# Patient Record
Sex: FEMALE | Race: BLACK OR AFRICAN AMERICAN | Employment: FULL TIME | URBAN - METROPOLITAN AREA
[De-identification: names, ages, dates, MRNs, and addresses within clinical notes are randomized per-mention and may not be internally consistent; named-entity substitution may affect disease eponyms.]

---

## 2017-01-19 ENCOUNTER — OFFICE VISIT (OUTPATIENT)
Dept: FAMILY MEDICINE CLINIC | Age: 37
End: 2017-01-19

## 2017-01-19 VITALS
WEIGHT: 158.6 LBS | BODY MASS INDEX: 28.1 KG/M2 | RESPIRATION RATE: 18 BRPM | SYSTOLIC BLOOD PRESSURE: 110 MMHG | HEART RATE: 91 BPM | HEIGHT: 63 IN | OXYGEN SATURATION: 98 % | DIASTOLIC BLOOD PRESSURE: 74 MMHG | TEMPERATURE: 98.2 F

## 2017-01-19 DIAGNOSIS — J30.89 PERENNIAL ALLERGIC RHINITIS, UNSPECIFIED ALLERGIC RHINITIS TRIGGER: Primary | ICD-10-CM

## 2017-01-19 RX ORDER — ACETAMINOPHEN AND CODEINE PHOSPHATE 120; 12 MG/5ML; MG/5ML
SOLUTION ORAL
COMMUNITY
End: 2017-04-17 | Stop reason: ALTCHOICE

## 2017-01-19 RX ORDER — FLUTICASONE PROPIONATE 50 MCG
SPRAY, SUSPENSION (ML) NASAL
Qty: 1 BOTTLE | Refills: 3 | Status: SHIPPED | OUTPATIENT
Start: 2017-01-19 | End: 2018-11-15

## 2017-01-19 NOTE — PROGRESS NOTES
Acute Care Visit    Today's Date:  2017   Patient's Name: Deng Johnson   Patient's :  1980     History:     Chief Complaint   Patient presents with    Establish Care    Watery Eyes     itchy throat, headaches, runny nose with yellow drainage, cough symptom  started in Dec.       Deng Johnson is a 39 y.o. female presenting for Acute Care      Allergic Rhinitis     Onset of symptoms: 3-4 months ago noticed that when started new job in [employer] she had symptoms. Was on antihistamine, flonase and albuterol in the past with improvements but stopped about 1 year ago  Reports history of seasonal allergeries  Current symtoms are: nasal congestion, sinus pressure, post nasal drip and puffy eyes  Triggers: new jobs  Currently taking claritin D with some improvements  Denies fevers, chills, wheezing or productive cough      Past Medical History   Diagnosis Date    Asthma      History reviewed. No pertinent past surgical history. Social History     Social History    Marital status: SINGLE     Spouse name: N/A    Number of children: N/A    Years of education: N/A     Social History Main Topics    Smoking status: Current Every Day Smoker     Packs/day: 0.50    Smokeless tobacco: None    Alcohol use 3.6 oz/week     6 Cans of beer per week    Drug use: No    Sexual activity: Yes     Partners: Male     Birth control/ protection: Pill, Condom     Other Topics Concern    None     Social History Narrative    None     Family History   Problem Relation Age of Onset   [place] Arthritis-rheumatoid Mother     Diabetes Father     High Cholesterol Father      Allergies   Allergen Reactions    Tylenol-Codeine #3 [Acetaminophen-Codeine] Rash    Vicodin [Hydrocodone-Acetaminophen] Rash       Problem List:    There is no problem list on file for this patient.       Medications:     Current Outpatient Prescriptions   Medication Sig    multivitamin, tx-iron-ca-min (THERA-M W/ IRON) 9 mg iron-400 mcg tab tablet Take 1 Tab by mouth daily.  norethindrone (ORTHO MICRONOR) 0.35 mg tab Take  by mouth.  fluticasone (FLONASE) 50 mcg/actuation nasal spray 1-2 sprays in each nostril daily     No current facility-administered medications for this visit. Constitutional: negative for fevers, chills, sweats and fatigue  Ears, nose, mouth, throat, and face: positive for nasal congestion and sore throat, negative for earaches  Respiratory: positive for cough, negative for sputum or wheezing  Cardiovascular: negative for chest pain, chest pressure/discomfort, dyspnea  Gastrointestinal: positive for nausea, negative for vomiting, diarrhea, constipation and abdominal pain    Physical Assessment:   VS:    Visit Vitals    /74 (BP 1 Location: Right arm, BP Patient Position: Sitting)    Pulse 91    Temp 98.2 °F (36.8 °C) (Oral)    Resp 18    Ht 5' 3\" (1.6 m)    Wt 158 lb 9.6 oz (71.9 kg)    LMP 01/07/2017 (Exact Date)    SpO2 98%    BMI 28.09 kg/m2       No results found for: NA, K, CL, CO2, AGAP, GLU, BUN, CREA, BUCR, GFRAA, GFRNA, CA, GFRAA    General:   Well-groomed, well-nourished, in no distress, pleasant, alert, appropriate and conversant. ENT:   Normal TM's and swollen nasal turbinates  Mouth:  Good dentition, oropharynx WNL without membranes, exudates, petechiae or ulcers  Neck:   Neck supple, no swelling, mass or tenderness, no thyromegaly  Cardiovasc:   RRR, no MRG. Pulses 2+ and symmetric at distal extremities. Pulmonary:   Lungs clear bilaterally. Normal respiratory effort. Abdomen:   Abdomen soft, NT, ND, NAB. Extremities:   No edema, no TTP bilateral calves. LEs warm and well-perfused. Neuro:   Alert and oriented,  no focal deficits. No facial asymmetry noted. Skin:    No rashes or jaundice  MSK:   Normal ROM, 5/5 muscle strength  Psych:  No pressured speech or abnormal thought content        Assessment/Plan & Orders:       1.  Perennial allergic rhinitis, unspecified allergic rhinitis trigger Orders Placed This Encounter    multivitamin, tx-iron-ca-min (THERA-M W/ IRON) 9 mg iron-400 mcg tab tablet    norethindrone (ORTHO MICRONOR) 0.35 mg tab    fluticasone (FLONASE) 50 mcg/actuation nasal spray       Allergic Rhinitis  -will send in flonase and recommend OTC antihistamine    Follow up in 2-3 months    *Plan of care reviewed with patient. Patient in agreement with plan and expresses understanding. All questions answered and patient encouraged to call or RTO if further questions or concerns.     Yvan Smith MD  Family Medicine  1/19/2017  12:34 PM

## 2017-01-19 NOTE — PATIENT INSTRUCTIONS
Sinusitis: Care Instructions  Your Care Instructions    Sinusitis is an infection of the lining of the sinus cavities in your head. Sinusitis often follows a cold. It causes pain and pressure in your head and face. In most cases, sinusitis gets better on its own in 1 to 2 weeks. But some mild symptoms may last for several weeks. Sometimes antibiotics are needed. Follow-up care is a key part of your treatment and safety. Be sure to make and go to all appointments, and call your doctor if you are having problems. It's also a good idea to know your test results and keep a list of the medicines you take. How can you care for yourself at home? · Take an over-the-counter pain medicine, such as acetaminophen (Tylenol), ibuprofen (Advil, Motrin), or naproxen (Aleve). Read and follow all instructions on the label. · If the doctor prescribed antibiotics, take them as directed. Do not stop taking them just because you feel better. You need to take the full course of antibiotics. · Be careful when taking over-the-counter cold or flu medicines and Tylenol at the same time. Many of these medicines have acetaminophen, which is Tylenol. Read the labels to make sure that you are not taking more than the recommended dose. Too much acetaminophen (Tylenol) can be harmful. · Breathe warm, moist air from a steamy shower, a hot bath, or a sink filled with hot water. Avoid cold, dry air. Using a humidifier in your home may help. Follow the directions for cleaning the machine. · Use saline (saltwater) nasal washes to help keep your nasal passages open and wash out mucus and bacteria. You can buy saline nose drops at a grocery store or drugstore. Or you can make your own at home by adding 1 teaspoon of salt and 1 teaspoon of baking soda to 2 cups of distilled water. If you make your own, fill a bulb syringe with the solution, insert the tip into your nostril, and squeeze gently. Aruna Baldwin your nose.   · Put a hot, wet towel or a warm gel pack on your face 3 or 4 times a day for 5 to 10 minutes each time. · Try a decongestant nasal spray like oxymetazoline (Afrin). Do not use it for more than 3 days in a row. Using it for more than 3 days can make your congestion worse. When should you call for help? Call your doctor now or seek immediate medical care if:  · You have new or worse swelling or redness in your face or around your eyes. · You have a new or higher fever. Watch closely for changes in your health, and be sure to contact your doctor if:  · You have new or worse facial pain. · The mucus from your nose becomes thicker (like pus) or has new blood in it. · You are not getting better as expected. Where can you learn more? Go to http://varun-maría.info/. Enter D819 in the search box to learn more about \"Sinusitis: Care Instructions. \"  Current as of: July 29, 2016  Content Version: 11.1  © 20064317-8427 [employer]. Care instructions adapted under license by [employer] (which disclaims liability or warranty for this information). If you have questions about a medical condition or this instruction, always ask your healthcare professional. [place] [address] any warranty or liability for your use of this information.

## 2017-02-10 ENCOUNTER — OFFICE VISIT (OUTPATIENT)
Dept: OBGYN CLINIC | Age: 37
End: 2017-02-10

## 2017-02-10 ENCOUNTER — HOSPITAL ENCOUNTER (OUTPATIENT)
Dept: LAB | Age: 37
Discharge: HOME | End: 2017-02-10
Payer: COMMERCIAL

## 2017-02-10 VITALS
DIASTOLIC BLOOD PRESSURE: 89 MMHG | WEIGHT: 164 LBS | SYSTOLIC BLOOD PRESSURE: 123 MMHG | HEIGHT: 63 IN | BODY MASS INDEX: 29.06 KG/M2 | HEART RATE: 86 BPM

## 2017-02-10 DIAGNOSIS — Z71.6 ENCOUNTER FOR SMOKING CESSATION COUNSELING: ICD-10-CM

## 2017-02-10 DIAGNOSIS — N93.8 DYSFUNCTIONAL UTERINE BLEEDING: Primary | ICD-10-CM

## 2017-02-10 DIAGNOSIS — N93.8 DYSFUNCTIONAL UTERINE BLEEDING: ICD-10-CM

## 2017-02-10 LAB
T4 FREE SERPL-MCNC: 0.9 NG/DL (ref 0.7–1.5)
TSH SERPL DL<=0.05 MIU/L-ACNC: 0.74 UIU/ML (ref 0.36–3.74)

## 2017-02-10 PROCEDURE — 83001 ASSAY OF GONADOTROPIN (FSH): CPT | Performed by: ADVANCED PRACTICE MIDWIFE

## 2017-02-10 PROCEDURE — 84403 ASSAY OF TOTAL TESTOSTERONE: CPT | Performed by: ADVANCED PRACTICE MIDWIFE

## 2017-02-10 PROCEDURE — 84146 ASSAY OF PROLACTIN: CPT | Performed by: ADVANCED PRACTICE MIDWIFE

## 2017-02-10 PROCEDURE — 82670 ASSAY OF TOTAL ESTRADIOL: CPT | Performed by: ADVANCED PRACTICE MIDWIFE

## 2017-02-10 PROCEDURE — 84439 ASSAY OF FREE THYROXINE: CPT | Performed by: ADVANCED PRACTICE MIDWIFE

## 2017-02-10 RX ORDER — NORETHINDRONE ACETATE AND ETHINYL ESTRADIOL 1MG-20(21)
1 KIT ORAL DAILY
Qty: 28 TAB | Refills: 1 | Status: SHIPPED | OUTPATIENT
Start: 2017-02-10 | End: 2017-04-17 | Stop reason: SDUPTHER

## 2017-02-10 NOTE — PROGRESS NOTES
SUBJ: Ms. Yuridia Anderson is a 39 y.o. y/o female, , who presents today for     Chief Complaint   Patient presents with   [address]       Her LMP was Patient's last menstrual period was 2017 (exact date). Crow Bermudez is a new patient to our practice. She was referred to our office by her PCP Dr. Noel Reeves. Patient is currently rx'ed Micronor POP from [facility]. She has been taking this medication since 2013. Last Pap and well woman performed at the health de[facility] 3/2016. Before birth control, patient had irregular menses. Menses only came q3-4 months and lasted 7 days. Periods have been irregular since menarche per pt, she has not had a work up for this concern. While on POP, she has had irregular menses that comes twice monthly and lasts 7-10 days. She may also have days of light spotting in between. The irregular bleeding has made work difficult because of the need to keep interrupting work to change her pad. She is interested in trying an alternative form of birth control. Patient has medical hx of asthma and OB hx of 1 AB and 2 SABs. Patient desires pregnancy in the future prior to age 36 if possible - \"within the next few years\". Patient does admit to being a daily smoker. She currently smokes 1 pack per day but is actively trying to stop smoking. Patient verbalizes readiness to change. She denies hx of cardiac events. Health hx reviewed.      Past Medical History   Diagnosis Date    Asthma       Past Surgical History   Procedure Laterality Date    Hx dilation and curettage  ,      Family History   Problem Relation Age of Onset   [facility] Arthritis-rheumatoid Mother     Diabetes Father     High Cholesterol Father     No Known Problems Brother     Thyroid Disease Maternal Grandmother      Social History     Social History    Marital status: SINGLE     Spouse name: N/A    Number of children: N/A    Years of education: N/A     Social History Main Topics    Smoking status: Current Every Day Smoker     Packs/day: 0.50    Smokeless tobacco: None    Alcohol use 3.6 oz/week     6 Cans of beer per week      Comment: occassion     Drug use: No    Sexual activity: Yes     Partners: Male     Birth control/ protection: Pill, Condom     Other Topics Concern    None     Social History Narrative         PE:   Visit Vitals    /89 (BP 1 Location: Right arm, BP Patient Position: Sitting)    Pulse 86    Ht 5' 3\" (1.6 m)    Wt 164 lb (74.4 kg)    LMP 01/07/2017 (Exact Date)    BMI 29.05 kg/m2       Pt is a well developed female who is alert and oriented x 3. CVS exam: normal rate, regular rhythm, normal S1, S2, no murmurs, rubs, clicks or gallops. Lungs: clear bilaterally to auscultation, no wheezing, rhonci or rales  Pelvic exam: VULVA: normal appearing vulva with no masses, tenderness or lesions, VAGINA: normal appearing vagina with normal color and discharge, no lesions, CERVIX: normal appearing cervix without discharge or lesions, cervical motion tenderness absent, UTERUS: uterus is normal size, shape, consistency and nontender, ADNEXA: normal adnexa in size, nontender and no masses. Assessment/Plan:       1. Dysfunctional uterine bleeding  Will do lab work to identify cause of irregular bleeding  Will change BC to low-dose GABE to regulate menses and improve patient quality of life  Counseled on all forms of birth control including GABE, patch, nuvaring, depo and IUD --> reviewed R/B/A and possible s/e  Patient desires low-dose GABE    - TSH AND FREE T4; Future  - PROLACTIN; Future  - norethindrone-ethinyl estradiol (JUNEL FE 1/20, 28,) 1 mg-20 mcg (21)/75 mg (7) tab; Take 1 Tab by mouth daily. Dispense: 28 Tab; Refill: 1  - FSH AND LH; Future  - ESTRADIOL; Future  - TESTOSTERONE, FREE & TOTAL; Future    2.  Encounter for smoking cessation counseling  Counseled on smoking cessation including triggers, stress and mechanisms to quit  Patient verbalizes readiness to quit  Counseled heavily on increased risk of cardiac event including MI or CVA if smoking while taking GABE  Pt verbalizes understanding of this risk and agrees to quit smoking  She desires to start this medication  D/C and seek ED care if chest pain, SOB or s/s of CVA  Patient to f/u with PCP about smoking cessation    Well woman exam due 3/2017    RTC in 1 month for GABE f/u and well woman exam    >50% of this visit spent in counseling

## 2017-02-10 NOTE — PATIENT INSTRUCTIONS

## 2017-02-11 LAB
ESTRADIOL SERPL-MCNC: 193.3 PG/ML
TESTOST FREE SERPL-MCNC: 3.9 PG/ML (ref 0–4.2)
TESTOST SERPL-MCNC: 47 NG/DL (ref 8–48)

## 2017-02-15 LAB
FSH SERPL-ACNC: 3.1 MIU/ML
LH SERPL-ACNC: 1.8 MIU/ML

## 2017-02-17 LAB — PROLACTIN SERPL-MCNC: 8.1 NG/ML

## 2017-04-17 ENCOUNTER — OFFICE VISIT (OUTPATIENT)
Dept: OBGYN CLINIC | Age: 37
End: 2017-04-17

## 2017-04-17 VITALS
WEIGHT: 158.4 LBS | HEIGHT: 63 IN | BODY MASS INDEX: 28.07 KG/M2 | SYSTOLIC BLOOD PRESSURE: 121 MMHG | HEART RATE: 100 BPM | DIASTOLIC BLOOD PRESSURE: 84 MMHG

## 2017-04-17 DIAGNOSIS — N93.8 DYSFUNCTIONAL UTERINE BLEEDING: ICD-10-CM

## 2017-04-17 DIAGNOSIS — Z71.6 ENCOUNTER FOR SMOKING CESSATION COUNSELING: Primary | ICD-10-CM

## 2017-04-17 RX ORDER — NORETHINDRONE ACETATE AND ETHINYL ESTRADIOL 1MG-20(21)
1 KIT ORAL DAILY
Qty: 28 TAB | Refills: 4 | Status: SHIPPED | OUTPATIENT
Start: 2017-04-17

## 2017-04-17 NOTE — PROGRESS NOTES
SUBJ: Ms. Iris Rascon is a 39 y.o. y/o female, , who presents today for     Chief Complaint   Patient presents with    Follow-up       Her LMP was Patient's last menstrual period was 2017. Tod Harding presents for follow up. Was seen in our office for DUB 2 months ago. Was taking Micronor po daily and began to have breakthrough bleeding on this method. Prior to taking POP, menses was irregular and only came q3-4 months. Since taking GABE, menses has regulated to once/month without complications. She denies menstrual concerns or current  complaints. Would like to continue GABE method until she is ready for conception. She also continues her smoking cessation efforts, has been only smoking on the weekends when she is off but will still go through half/pack. She is trying to find other physical activities to help her stop smoking.     Past Medical History:   Diagnosis Date    Asthma       Past Surgical History:   Procedure Laterality Date    HX DILATION AND CURETTAGE  ,      Family History   Problem Relation Age of Onset   [place] Arthritis-rheumatoid Mother     Diabetes Father     High Cholesterol Father     No Known Problems Brother     Thyroid Disease Maternal Grandmother      Social History     Social History    Marital status: SINGLE     Spouse name: N/A    Number of children: N/A    Years of education: N/A     Social History Main Topics    Smoking status: Current Every Day Smoker     Packs/day: 0.50    Smokeless tobacco: None    Alcohol use 3.6 oz/week     6 Cans of beer per week      Comment: occassion     Drug use: No    Sexual activity: Yes     Partners: Male     Birth control/ protection: Pill, Condom     Other Topics Concern    None     Social History Narrative         PE:   Visit Vitals    /84 (BP 1 Location: Right arm, BP Patient Position: Sitting)    Pulse 100    Ht 5' 3\" (1.6 m)    Wt 158 lb 6.4 oz (71.8 kg)    LMP 2017    BMI 28.06 kg/m2       Pt is a well developed female who is alert and oriented x 3. CVS exam: normal rate, regular rhythm, normal S1, S2, no murmurs, rubs, clicks or gallops. Lungs: clear bilaterally to auscultation, no wheezing, rhonci or rales  Pelvic exam: examination not indicated. Assessment/Plan:       1. Dysfunctional uterine bleeding  Will continue this method for 6 months, patient desires to do well woman exam at that time  - norethindrone-ethinyl estradiol (JUNEL FE 1/20, 28,) 1 mg-20 mcg (21)/75 mg (7) tab; Take 1 Tab by mouth daily. Dispense: 28 Tab;  Refill: 4  Discussed smoking cessation programs or medications if needed, she will discuss further with primary care at her upcoming visit in May    [facility] in 6 months for well woman exam  >50% of this visit spent in counseling

## 2017-04-17 NOTE — PATIENT INSTRUCTIONS

## 2017-05-25 DIAGNOSIS — N93.8 DYSFUNCTIONAL UTERINE BLEEDING: ICD-10-CM

## 2017-06-01 RX ORDER — NORETHINDRONE ACETATE AND ETHINYL ESTRADIOL AND FERROUS FUMARATE 1MG-20(21)
KIT ORAL
Qty: 28 TAB | Refills: 0 | Status: SHIPPED | OUTPATIENT
Start: 2017-06-01

## 2017-06-09 ENCOUNTER — OFFICE VISIT (OUTPATIENT)
Dept: FAMILY MEDICINE CLINIC | Age: 37
End: 2017-06-09

## 2017-06-09 VITALS
BODY MASS INDEX: 28.7 KG/M2 | TEMPERATURE: 97.8 F | WEIGHT: 162 LBS | SYSTOLIC BLOOD PRESSURE: 110 MMHG | HEIGHT: 63 IN | OXYGEN SATURATION: 100 % | HEART RATE: 87 BPM | RESPIRATION RATE: 20 BRPM | DIASTOLIC BLOOD PRESSURE: 80 MMHG

## 2017-06-09 DIAGNOSIS — J30.9 ALLERGIC RHINITIS, UNSPECIFIED ALLERGIC RHINITIS TRIGGER, UNSPECIFIED RHINITIS SEASONALITY: Primary | ICD-10-CM

## 2017-06-09 NOTE — PROGRESS NOTES
Chronic Care Visit    Today's Date:  2017   Patient's Name: Khadijah Wright   Patient's :  1980     History:     Chief Complaint   Patient presents with    Follow-up     pt here for follow up allergies       Khadijah Wright is a 39 y.o. female presenting for Chronic Care      Allergic Rhinitis     Since last visit she is stable on flonase  Onset of symptoms: 3-4 months ago noticed that when started new job in [employer] she had symptoms. Reports history of seasonal allergeries  Current symtoms are: nasal congestion, sinus pressure, post nasal drip and puffy eyes  Triggers: new jobs  Currently taking antihistamine with flonase  Denies fevers, chills, wheezing or productive cough      Past Medical History:   Diagnosis Date    Asthma      Past Surgical History:   Procedure Laterality Date    HX DILATION AND CURETTAGE  ,      Social History     Social History    Marital status: SINGLE     Spouse name: N/A    Number of children: N/A    Years of education: N/A     Social History Main Topics    Smoking status: Current Every Day Smoker     Packs/day: 0.50    Smokeless tobacco: None    Alcohol use 3.6 oz/week     6 Cans of beer per week      Comment: occassion     Drug use: No    Sexual activity: Yes     Partners: Male     Birth control/ protection: Pill, Condom     Other Topics Concern    None     Social History Narrative     Family History   Problem Relation Age of Onset   [facility] Arthritis-rheumatoid Mother     Diabetes Father     High Cholesterol Father     No Known Problems Brother     Thyroid Disease Maternal Grandmother      Allergies   Allergen Reactions    Tylenol-Codeine #3 [Acetaminophen-Codeine] Rash    Vicodin [Hydrocodone-Acetaminophen] Rash       Problem List:    There is no problem list on file for this patient.       Medications:     Current Outpatient Prescriptions   Medication Sig    MICROGESTIN FE , , 1 mg-20 mcg (21)/75 mg (7) tab TAKE 1 TABLET BY MOUTH DAILY    multivitamin, tx-iron-ca-min (THERA-M W/ IRON) 9 mg iron-400 mcg tab tablet Take 1 Tab by mouth daily.  fluticasone (FLONASE) 50 mcg/actuation nasal spray 1-2 sprays in each nostril daily    norethindrone-ethinyl estradiol (JUNEL FE 1/20, 28,) 1 mg-20 mcg (21)/75 mg (7) tab Take 1 Tab by mouth daily. No current facility-administered medications for this visit. Constitutional: negative for fevers, chills, sweats and fatigue  Ears, nose, mouth, throat, and face: positive for nasal congestion and sore throat, negative for earaches  Respiratory: positive for cough, negative for sputum or wheezing  Cardiovascular: negative for chest pain, chest pressure/discomfort, dyspnea  Gastrointestinal: positive for nausea, negative for vomiting, diarrhea, constipation and abdominal pain    Physical Assessment:   VS:    Visit Vitals    /80 (BP 1 Location: Left arm, BP Patient Position: Sitting)    Pulse 87    Temp 97.8 °F (36.6 °C) (Oral)    Resp 20    Ht 5' 3\" (1.6 m)    Wt 162 lb (73.5 kg)    LMP 05/22/2017    SpO2 100%    BMI 28.7 kg/m2       No results found for: NA, K, CL, CO2, AGAP, GLU, BUN, CREA, BUCR, GFRAA, GFRNA, CA, GFRAA    General:   Well-groomed, well-nourished, in no distress, pleasant, alert, appropriate and conversant. ENT:   Normal TM's and swollen nasal turbinates  Mouth:  Good dentition, oropharynx WNL without membranes, exudates, petechiae or ulcers  Neck:   Neck supple, no swelling, mass or tenderness, no thyromegaly  Cardiovasc:   RRR, no MRG. Pulses 2+ and symmetric at distal extremities. Pulmonary:   Lungs clear bilaterally. Normal respiratory effort. Abdomen:   Abdomen soft, NT, ND, NAB. Extremities:   No edema, no TTP bilateral calves. LEs warm and well-perfused. Neuro:   Alert and oriented,  no focal deficits. No facial asymmetry noted.   Skin:    No rashes or jaundice  MSK:   Normal ROM, 5/5 muscle strength  Psych:  No pressured speech or abnormal thought content        Assessment/Plan & Orders:       1. Allergic rhinitis, unspecified allergic rhinitis trigger, unspecified rhinitis seasonality        No orders of the defined types were placed in this encounter. Allergic Rhinitis  -stable on flonase and recommend OTC antihistamine    Follow up in 3-6 months    *Plan of care reviewed with patient. Patient in agreement with plan and expresses understanding. All questions answered and patient encouraged to call or RTO if further questions or concerns.     Kate Humphrey MD  Family Medicine  6/9/2017  8:20 AM

## 2018-01-20 ENCOUNTER — HOSPITAL ENCOUNTER (EMERGENCY)
Age: 38
Discharge: HOME | End: 2018-01-20
Attending: EMERGENCY MEDICINE
Payer: SELF-PAY

## 2018-01-20 VITALS
TEMPERATURE: 98.8 F | HEART RATE: 99 BPM | BODY MASS INDEX: 29.23 KG/M2 | SYSTOLIC BLOOD PRESSURE: 122 MMHG | HEIGHT: 63 IN | OXYGEN SATURATION: 99 % | DIASTOLIC BLOOD PRESSURE: 86 MMHG | RESPIRATION RATE: 16 BRPM | WEIGHT: 165 LBS

## 2018-01-20 DIAGNOSIS — B96.89 BV (BACTERIAL VAGINOSIS): Primary | ICD-10-CM

## 2018-01-20 DIAGNOSIS — L30.9 DERMATITIS: ICD-10-CM

## 2018-01-20 DIAGNOSIS — N76.0 BV (BACTERIAL VAGINOSIS): Primary | ICD-10-CM

## 2018-01-20 LAB
APPEARANCE UR: ABNORMAL
BACTERIA URNS QL MICRO: NEGATIVE /HPF
BILIRUB UR QL: NEGATIVE
COLOR UR: YELLOW
EPITH CASTS URNS QL MICRO: NORMAL /LPF (ref 0–5)
GLUCOSE UR STRIP.AUTO-MCNC: NEGATIVE MG/DL
HCG UR QL: NEGATIVE
HGB UR QL STRIP: NEGATIVE
KETONES UR QL STRIP.AUTO: NEGATIVE MG/DL
LEUKOCYTE ESTERASE UR QL STRIP.AUTO: ABNORMAL
NITRITE UR QL STRIP.AUTO: NEGATIVE
PH UR STRIP: 6 [PH] (ref 5–8)
PROT UR STRIP-MCNC: NEGATIVE MG/DL
RBC #/AREA URNS HPF: NEGATIVE /HPF (ref 0–5)
SERVICE CMNT-IMP: NORMAL
SP GR UR REFRACTOMETRY: 1.01 (ref 1–1.03)
UROBILINOGEN UR QL STRIP.AUTO: 0.2 EU/DL (ref 0.2–1)
WBC URNS QL MICRO: NORMAL /HPF (ref 0–4)
WET PREP GENITAL: NORMAL

## 2018-01-20 PROCEDURE — 87210 SMEAR WET MOUNT SALINE/INK: CPT

## 2018-01-20 PROCEDURE — 81025 URINE PREGNANCY TEST: CPT | Performed by: EMERGENCY MEDICINE

## 2018-01-20 PROCEDURE — 81001 URINALYSIS AUTO W/SCOPE: CPT | Performed by: EMERGENCY MEDICINE

## 2018-01-20 PROCEDURE — 87591 N.GONORRHOEAE DNA AMP PROB: CPT

## 2018-01-20 PROCEDURE — 99284 EMERGENCY DEPT VISIT MOD MDM: CPT

## 2018-01-20 RX ORDER — METRONIDAZOLE 7.5 MG/G
1 GEL VAGINAL
Qty: 187.5 MG | Refills: 0 | Status: SHIPPED | OUTPATIENT
Start: 2018-01-20 | End: 2018-01-25

## 2018-01-20 RX ORDER — METHYLPREDNISOLONE 4 MG/1
TABLET ORAL
Qty: 1 DOSE PACK | Refills: 0 | Status: SHIPPED | OUTPATIENT
Start: 2018-01-20 | End: 2018-11-15

## 2018-01-20 NOTE — ED TRIAGE NOTES
Reports \"I have broke out all over my body, and in my vaginal area\" x3 weeks. Reports +vaginal discharge. Reports +urinary frequency.

## 2018-01-20 NOTE — ED NOTES
Carlos Ashby  [facility] EMERGENCY DEPT        PHYSICIAN: Yola Olivier MD  NURSE PRACTITIONER: Rashaad Abdullahi NP  PCP: Rex Marie MD    CHIEF COMPLAINT:   Chief Complaint   Patient presents with    Rash    Vaginal Discharge           1:20 PM Felicia Putnam is a 40 y.o. female who presents to the emergency department with c/o generalized rash x 3 weeks and vaginal discharge onset 1 week ago. Pt states she is sexually active with one male partner, not aware of any STI exposure. She states she used a new soap a few weeks ago and broke out in rash, she has been using OTC creams for itching, but it has not helped. She states last time she had an outbreak like this she needed steroids. Denies possible pregnancy. LMP 12/27/17. Review of Systems   Constitutional: Negative for chills, fever and malaise/fatigue. HENT: Negative for congestion, sinus pain and sore throat. Respiratory: Negative for cough and shortness of breath. Cardiovascular: Negative for chest pain and palpitations. Gastrointestinal: Negative for abdominal pain, diarrhea, nausea and vomiting. Genitourinary: Negative for dysuria. Vaginal discharge   Musculoskeletal: Negative for falls and myalgias. Skin: Negative for rash. Generalized rash for 3 weeks   Neurological: Negative for dizziness and headaches. HISTORY:  Active Ambulatory Problems     Diagnosis Date Noted    No Active Ambulatory Problems     Resolved Ambulatory Problems     Diagnosis Date Noted    No Resolved Ambulatory Problems     Past Medical History:   Diagnosis Date    Asthma        Past Surgical History:   Procedure Laterality Date    HX DILATION AND CURETTAGE  2005, 2006       Social History     Social History    Marital status: SINGLE     Spouse name: N/A    Number of children: N/A    Years of education: N/A     Occupational History    Not on file.      Social History Main Topics    Smoking status: Current Every Day Smoker Packs/day: 0.50    Smokeless tobacco: Not on file    Alcohol use 3.6 oz/week     6 Cans of beer per week      Comment: occassion     Drug use: No    Sexual activity: Yes     Partners: Male     Birth control/ protection: Pill, Condom     Other Topics Concern    Not on file     Social History Narrative       MEDICATION LIST:  No current facility-administered medications for this encounter. Current Outpatient Prescriptions   Medication Sig    metroNIDAZOLE (METROGEL VAGINAL) 0.75 % vaginal gel Insert 1 Applicator into vagina nightly for 5 days.  methylPREDNISolone (MEDROL, SAJI,) 4 mg tablet Take as directed.  MICROGESTIN FE 1/20, 28, 1 mg-20 mcg (21)/75 mg (7) tab TAKE 1 TABLET BY MOUTH DAILY    norethindrone-ethinyl estradiol (JUNEL FE 1/20, 28,) 1 mg-20 mcg (21)/75 mg (7) tab Take 1 Tab by mouth daily.  multivitamin, tx-iron-ca-min (THERA-M W/ IRON) 9 mg iron-400 mcg tab tablet Take 1 Tab by mouth daily.  fluticasone (FLONASE) 50 mcg/actuation nasal spray 1-2 sprays in each nostril daily       PHYSICAL EXAM:  Patient Vitals for the past 12 hrs:   Temp Pulse Resp BP SpO2   01/20/18 1254 98.8 °F (37.1 °C) 99 16 122/86 99 %       Physical Exam   Constitutional: She is oriented to person, place, and time and well-developed, well-nourished, and in no distress. HENT:   Head: Normocephalic. Eyes: EOM are normal. Pupils are equal, round, and reactive to light. Neck: Normal range of motion. Neck supple. Cardiovascular: Normal rate, regular rhythm, normal heart sounds and intact distal pulses. Pulmonary/Chest: Effort normal and breath sounds normal. No respiratory distress. Abdominal: Soft. Bowel sounds are normal. She exhibits no distension. There is no tenderness. Genitourinary: Uterus normal and cervix normal. Vulva exhibits no rash. Vagina exhibits normal mucosa and no lesion. Thick  white and vaginal discharge found. Musculoskeletal: Normal range of motion.    Neurological: She is alert and oriented to person, place, and time. Skin: Skin is warm and dry. Psychiatric: Affect normal.   Nursing note and vitals reviewed. Orders Placed:  Orders Placed This Encounter    WET PREP     Standing Status:   Standing     Number of Occurrences:   1    URINALYSIS W/ RFLX MICROSCOPIC     Standing Status:   Standing     Number of Occurrences:   1    HCG URINE, QL     Standing Status:   Standing     Number of Occurrences:   1    CHLAMYDIA/NEISSERIA AMPLIFICATION     Standing Status:   Standing     Number of Occurrences:   1     Order Specific Question:   Specimen type/source     Answer:   Endocervical [[id number]]    URINE MICROSCOPIC ONLY     Standing Status:   Standing     Number of Occurrences:   1    metroNIDAZOLE (METROGEL VAGINAL) 0.75 % vaginal gel     Sig: Insert 1 Applicator into vagina nightly for 5 days. Dispense:  187.5 mg     Refill:  0    methylPREDNISolone (MEDROL, SAJI,) 4 mg tablet     Sig: Take as directed.      Dispense:  1 Dose Pack     Refill:  0       LABS:  Recent Results (from the past 24 hour(s))   URINALYSIS W/ RFLX MICROSCOPIC    Collection Time: 01/20/18  1:00 PM   Result Value Ref Range    Color YELLOW      Appearance CLOUDY      Specific gravity 1.015 1.005 - 1.030      pH (UA) 6.0 5.0 - 8.0      Protein NEGATIVE  NEG mg/dL    Glucose NEGATIVE  NEG mg/dL    Ketone NEGATIVE  NEG mg/dL    Bilirubin NEGATIVE  NEG      Blood NEGATIVE  NEG      Urobilinogen 0.2 0.2 - 1.0 EU/dL    Nitrites NEGATIVE  NEG      Leukocyte Esterase SMALL (A) NEG     HCG URINE, QL    Collection Time: 01/20/18  1:00 PM   Result Value Ref Range    HCG urine, Ql. NEGATIVE  NEG     URINE MICROSCOPIC ONLY    Collection Time: 01/20/18  1:00 PM   Result Value Ref Range    WBC 3 to 6 0 - 4 /hpf    RBC NEGATIVE  0 - 5 /hpf    Epithelial cells 3+ 0 - 5 /lpf    Bacteria NEGATIVE  NEG /hpf   WET PREP    Collection Time: 01/20/18  1:45 PM   Result Value Ref Range    Special Requests: NO SPECIAL REQUESTS Wet prep MODERATE  CLUE CELLS PRESENT        Wet prep NO TRICHOMONAS SEEN      Wet prep NO YEAST SEEN         RADIOLOGY:  No results found. ED PROCEDURES: none          ECG: none    ED COURSE/MDM:  Pt started on steroid dose pack for rash and metrogel for BV. Instructed to f/u with PCP next week. DDX Included:  2:36 PM  Differential diagnoses/ medical decision making:    Urticaria, viral rash, contact dermatitis, bacterial infection, fungal/ candidal rash, or symptom of underlying disease, connective tissue disorder, vasculitis (to include ITP and TTP), versus other etiologies or a combination of the above. Discharge Diagnosis    ICD-10-CM ICD-9-CM   1. BV (bacterial vaginosis) N76.0 616.10    B96.89 041.9   2. Dermatitis L30.9 692.9         DISPOSITION: Discharge home. Follow-up Information     Follow up With Details Comments [address], [place]             Current Discharge Medication List      START taking these medications    Details   metroNIDAZOLE (METROGEL VAGINAL) 0.75 % vaginal gel Insert 1 Applicator into vagina nightly for 5 days. Qty: 187.5 mg, Refills: 0      methylPREDNISolone (MEDROL, SAJI,) 4 mg tablet Take as directed.   Qty: 1 Dose Pack, Refills: 0               Kevin Schuler NP

## 2018-01-20 NOTE — DISCHARGE INSTRUCTIONS
Bacterial Vaginosis: Care Instructions  Your Care Instructions    Bacterial vaginosis is a type of vaginal infection. It is caused by excess growth of certain bacteria that are normally found in the vagina. Symptoms can include itching, swelling, pain when you urinate or have sex, and a gray or yellow discharge with a \"fishy\" odor. It is not considered an infection that is spread through sexual contact. Although symptoms can be annoying and uncomfortable, bacterial vaginosis does not usually cause other health problems. However, if you have it while you are pregnant, it can cause complications. While the infection may go away on its own, most doctors use antibiotics to treat it. You may have been prescribed pills or vaginal cream. With treatment, bacterial vaginosis usually clears up in 5 to 7 days. Follow-up care is a key part of your treatment and safety. Be sure to make and go to all appointments, and call your doctor if you are having problems. It's also a good idea to know your test results and keep a list of the medicines you take. How can you care for yourself at home? · Take your antibiotics as directed. Do not stop taking them just because you feel better. You need to take the full course of antibiotics. · Do not eat or drink anything that contains alcohol if you are taking metronidazole (Flagyl). · Keep using your medicine if you start your period. Use pads instead of tampons while using a vaginal cream or suppository. Tampons can absorb the medicine. · Wear loose cotton clothing. Do not wear nylon and other materials that hold body heat and moisture close to the skin. · Do not scratch. Relieve itching with a cold pack or a cool bath. · Do not wash your vaginal area more than once a day. Use plain water or a mild, unscented soap. Do not douche. When should you call for help?   Watch closely for changes in your health, and be sure to contact your doctor if:  ? · You have unexpected vaginal bleeding. ? · You have a fever. ? · You have new or increased pain in your vagina or pelvis. ? · You are not getting better after 1 week. ? · Your symptoms return after you finish the course of your medicine. Where can you learn more? Go to http://varun-maría.info/. Janett Ceron in the search box to learn more about \"Bacterial Vaginosis: Care Instructions. \"  Current as of: October 13, 2016  Content Version: 11.4  © 5787-7309 [employer]. Care instructions adapted under license by [employer] (which disclaims liability or warranty for this information). If you have questions about a medical condition or this instruction, always ask your healthcare professional. [address] any warranty or liability for your use of this information. Dermatitis: Care Instructions  Your Care Instructions  Dermatitis is the general name used for any rash or inflammation of the skin. Different kinds of dermatitis cause different kinds of rashes. Common causes of a rash include new medicines, plants (such as poison oak or poison ivy), heat, and stress. Certain illnesses can also cause a rash. An allergic reaction to something that touches your skin, such as latex, nickel, or poison ivy, is called contact dermatitis. Contact dermatitis may also be caused by something that irritates the skin, such as bleach, a chemical, or soap. These types of rashes cannot be spread from person to person. How long your rash will last depends on what caused it. Rashes may last a few days or months. Follow-up care is a key part of your treatment and safety. Be sure to make and go to all appointments, and call your doctor if you are having problems. It's also a good idea to know your test results and keep a list of the medicines you take. How can you care for yourself at home? · Do not scratch the rash. Cut your nails short, and file them smooth.  Or wear gloves if this helps keep you from scratching. · Wash the area with water only. Pat dry. · Put cold, wet cloths on the rash to reduce itching. · Keep cool, and stay out of the sun. · Leave the rash open to the air as much as possible. · If the rash itches, use hydrocortisone cream. Follow the directions on the label. Calamine lotion may help for plant rashes. · Take an over-the-counter antihistamine, such as diphenhydramine (Benadryl) or loratadine (Claritin), to help calm the itching. Read and follow all instructions on the label. · If your doctor prescribed a cream, use it as directed. If your doctor prescribed medicine, take it exactly as directed. When should you call for help? Call your doctor now or seek immediate medical care if:  ? · You have symptoms of infection, such as:  ¨ Increased pain, swelling, warmth, or redness. ¨ Red streaks leading from the area. ¨ Pus draining from the area. ¨ A fever. ? · You have joint pain along with the rash. ? Watch closely for changes in your health, and be sure to contact your doctor if:  ? · Your rash is changing or getting worse. ? · You are not getting better as expected. Where can you learn more? Go to http://varun-maría.info/. Enter [phone] in the search box to learn more about \"Dermatitis: Care Instructions. \"  Current as of: October 13, 2016  Content Version: 11.4  © 7019-4999 [employer]. Care instructions adapted under license by [employer] (which disclaims liability or warranty for this information). If you have questions about a medical condition or this instruction, always ask your healthcare professional. [place] [address] any warranty or liability for your use of this information.

## 2018-01-20 NOTE — ED NOTES
Juaquin Rosales is a 40 y.o. female that was discharged in stable condition. The patients diagnosis, condition and treatment were explained to  patient and aftercare instructions were given. The patient verbalized understanding. Patient armband removed and shredded.

## 2018-01-22 LAB
C TRACH RRNA SPEC QL NAA+PROBE: NEGATIVE
N GONORRHOEA RRNA SPEC QL NAA+PROBE: NEGATIVE
SPECIMEN SOURCE: NORMAL

## 2018-11-15 ENCOUNTER — HOSPITAL ENCOUNTER (EMERGENCY)
Age: 38
Discharge: HOME | End: 2018-11-15
Attending: EMERGENCY MEDICINE
Payer: SELF-PAY

## 2018-11-15 VITALS
TEMPERATURE: 98.1 F | SYSTOLIC BLOOD PRESSURE: 133 MMHG | BODY MASS INDEX: 31.89 KG/M2 | HEIGHT: 63 IN | OXYGEN SATURATION: 97 % | HEART RATE: 104 BPM | WEIGHT: 180 LBS | RESPIRATION RATE: 18 BRPM | DIASTOLIC BLOOD PRESSURE: 88 MMHG

## 2018-11-15 DIAGNOSIS — J20.9 ACUTE BRONCHITIS, UNSPECIFIED ORGANISM: Primary | ICD-10-CM

## 2018-11-15 PROCEDURE — 99281 EMR DPT VST MAYX REQ PHY/QHP: CPT

## 2018-11-15 RX ORDER — DOXYCYCLINE 100 MG/1
100 CAPSULE ORAL 2 TIMES DAILY
Qty: 20 CAP | Refills: 0 | Status: SHIPPED | OUTPATIENT
Start: 2018-11-15 | End: 2018-11-25

## 2018-11-15 NOTE — ED TRIAGE NOTES
Patient c/o chest congestion and sore throat. She states she has been unable to cough up any sputum.

## 2018-11-15 NOTE — DISCHARGE INSTRUCTIONS
Bronchitis: Care Instructions  Your Care Instructions    Bronchitis is inflammation of the bronchial tubes, which carry air to the lungs. The tubes swell and produce mucus, or phlegm. The mucus and inflamed bronchial tubes make you cough. You may have trouble breathing. Most cases of bronchitis are caused by viruses like those that cause colds. Antibiotics usually do not help and they may be harmful. Bronchitis usually develops rapidly and lasts about 2 to 3 weeks in otherwise healthy people. Follow-up care is a key part of your treatment and safety. Be sure to make and go to all appointments, and call your doctor if you are having problems. It's also a good idea to know your test results and keep a list of the medicines you take. How can you care for yourself at home? · Take all medicines exactly as prescribed. Call your doctor if you think you are having a problem with your medicine. · Get some extra rest.  · Take an over-the-counter pain medicine, such as acetaminophen (Tylenol), ibuprofen (Advil, Motrin), or naproxen (Aleve) to reduce fever and relieve body aches. Read and follow all instructions on the label. · Do not take two or more pain medicines at the same time unless the doctor told you to. Many pain medicines have acetaminophen, which is Tylenol. Too much acetaminophen (Tylenol) can be harmful. · Take an over-the-counter cough medicine that contains dextromethorphan to help quiet a dry, hacking cough so that you can sleep. Avoid cough medicines that have more than one active ingredient. Read and follow all instructions on the label. · Breathe moist air from a humidifier, hot shower, or sink filled with hot water. The heat and moisture will thin mucus so you can cough it out. · Do not smoke. Smoking can make bronchitis worse. If you need help quitting, talk to your doctor about stop-smoking programs and medicines. These can increase your chances of quitting for good.   When should you call for help? Call 911 anytime you think you may need emergency care. For example, call if:    · You have severe trouble breathing.    Call your doctor now or seek immediate medical care if:    · You have new or worse trouble breathing.     · You cough up dark brown or bloody mucus (sputum).     · You have a new or higher fever.     · You have a new rash.    Watch closely for changes in your health, and be sure to contact your doctor if:    · You cough more deeply or more often, especially if you notice more mucus or a change in the color of your mucus.     · You are not getting better as expected. Where can you learn more? Go to http://varun-maría.info/. Enter H333 in the search box to learn more about \"Bronchitis: Care Instructions. \"  Current as of: December 6, 2017  Content Version: 11.8  © 0694-7506 [employer]. Care instructions adapted under license by [employer] (which disclaims liability or warranty for this information). If you have questions about a medical condition or this instruction, always ask your healthcare professional. [address] any warranty or liability for your use of this information.

## 2018-11-15 NOTE — ED NOTES
Mabel Hawkins is a 45 y.o. female that was discharged in stable condition. The patients diagnosis, condition and treatment were explained to  patient and aftercare instructions were given. The patient verbalized understanding. Patient armband removed and shredded.

## 2018-11-15 NOTE — ED PROVIDER NOTES
EMERGENCY DEPARTMENT HISTORY AND PHYSICAL EXAM 
 
8:03 AM 
 
 
Date: 11/15/2018 Patient Name: Alexsandra Rooney 
 
History of Presenting Illness Chief Complaint Patient presents with  Chest Congestion  Sore Throat History Provided By: Patient Chief Complaint: Cough Duration: 1 Weeks Timing:  Constant Quality: Productive and Dry Severity: Moderate Modifying Factors: Tried drinking tea Associated Symptoms: Fever, sore throat, congestion Additional History (Context): Alexsandra Rooney is a 45 y.o. female with PMHx of asthma who presents with a constant moderate productive/dry cough that started x 1 week ago. Pt tried drinking tea to \"break up the cold\". She describes the phlegm as a yellow color. Associated Sx include a fever, sore throat, and congestion. Admits to tobacco use. Denies any further complaints or symptoms at the moment. PCP: None Current Outpatient Medications Medication Sig Dispense Refill  doxycycline (MONODOX) 100 mg capsule Take 1 Cap by mouth two (2) times a day for 10 days. 20 Cap 0  
 MICROGESTIN FE 1/20, 28, 1 mg-20 mcg (21)/75 mg (7) tab TAKE 1 TABLET BY MOUTH DAILY 28 Tab 0  
 norethindrone-ethinyl estradiol (JUNEL FE 1/20, 28,) 1 mg-20 mcg (21)/75 mg (7) tab Take 1 Tab by mouth daily. 28 Tab 4  
 multivitamin, tx-iron-ca-min (THERA-M W/ IRON) 9 mg iron-400 mcg tab tablet Take 1 Tab by mouth daily. Past History Past Medical History: 
Past Medical History:  
Diagnosis Date  Asthma Past Surgical History: 
Past Surgical History:  
Procedure Laterality Date  HX DILATION AND CURETTAGE  2005, 2006 Family History: 
Family History Problem Relation Age of Onset  Arthritis-rheumatoid Mother  Diabetes Father  High Cholesterol Father  No Known Problems Brother  Thyroid Disease Maternal Grandmother Social History: 
Social History Tobacco Use  Smoking status: Current Every Day Smoker Packs/day: 0.50 Substance Use Topics  Alcohol use: Yes Alcohol/week: 3.6 oz Types: 6 Cans of beer per week Comment: occassion  Drug use: No  
 
 
Allergies: Allergies Allergen Reactions  Tylenol-Codeine #3 [Acetaminophen-Codeine] Rash  Vicodin [Hydrocodone-Acetaminophen] Rash Review of Systems Review of Systems Constitutional: Positive for fever. Negative for chills and fatigue. HENT: Positive for congestion and sore throat. Eyes: Negative. Respiratory: Positive for cough. Negative for shortness of breath. Cardiovascular: Negative for chest pain and palpitations. Gastrointestinal: Negative for abdominal pain, nausea and vomiting. Genitourinary: Negative for dysuria. Musculoskeletal: Negative. Skin: Negative. Neurological: Negative for dizziness, weakness, light-headedness and headaches. Psychiatric/Behavioral: Negative. All other systems reviewed and are negative. Physical Exam  
 
Visit Vitals /88 (BP 1 Location: Left arm, BP Patient Position: At rest;Sitting) Pulse (!) 104 Temp 98.1 °F (36.7 °C) Resp 18 Ht 5' 3\" (1.6 m) Wt 81.6 kg (180 lb) SpO2 97% BMI 31.89 kg/m² Physical Exam  
Constitutional: She is oriented to person, place, and time. She appears well-developed and well-nourished. HENT:  
Head: Normocephalic and atraumatic. Mouth/Throat: Oropharynx is clear and moist.  
Eyes: Conjunctivae are normal. Pupils are equal, round, and reactive to light. No scleral icterus. Neck: Normal range of motion. Neck supple. No JVD present. No tracheal deviation present. Cardiovascular: Normal rate, regular rhythm and normal heart sounds. Pulmonary/Chest: Effort normal and breath sounds normal. No respiratory distress. She has no wheezes. Positive for mild rhonchi bilaterally with good air movement. Abdominal: Soft. Bowel sounds are normal.  
Musculoskeletal: Normal range of motion. Neurological: She is alert and oriented to person, place, and time. She has normal strength. Gait normal. GCS eye subscore is 4. GCS verbal subscore is 5. GCS motor subscore is 6. Skin: Skin is warm and dry. Psychiatric: She has a normal mood and affect. Nursing note and vitals reviewed. Diagnostic Study Results Labs - No results found for this or any previous visit (from the past 12 hour(s)). Radiologic Studies - No orders to display Medical Decision Making I am the first provider for this patient. I reviewed the vital signs, available nursing notes, past medical history, past surgical history, family history and social history. Vital Signs-Reviewed the patient's vital signs. Pulse Oximetry Analysis -  97 % on RA, normal  
 
Records Reviewed: Nursing Notes and Old Medical Records (Time of Review: 8:03 AM) ED Course: Progress Notes, Reevaluation, and Consults: 
 
Provider Notes (Medical Decision Making):  
 
Diagnosis Clinical Impression: 1. Acute bronchitis, unspecified organism Disposition: discharged. Follow-up Information Follow up With Specialties Details Why Contact Savanna Michael  Schedule an appointment as soon as possible for a visit in 1 week If symptoms worsen, For Follow-Up [address] [facility] [place] [address] 
[phone] [id number] [facility] EMERGENCY DEPT Emergency Medicine Go to As needed, If symptoms worsen [address] [id number] [phone] Medication List  
  
START taking these medications   
doxycycline 100 mg capsule Commonly known as:  [place] Take 1 Cap by mouth two (2) times a day for 10 days. ASK your doctor about these medications   
multivitamin, tx-iron-ca-min 9 mg iron-400 mcg Tab tablet Commonly known as:  THERA-M w/ IRON * norethindrone-ethinyl estradiol 1 mg-20 mcg (21)/75 mg (7) Tab Commonly known as:  Rob Killer FE 1/20 (28) Take 1 Tab by mouth daily. * MICROGESTIN FE 1/20 (28) 1 mg-20 mcg (21)/75 mg (7) Tab Generic drug:  norethindrone-ethinyl estradiol TAKE 1 TABLET BY MOUTH DAILY * This list has 2 medication(s) that are the same as other medications prescribed for you. Read the directions carefully, and ask your doctor or other care provider to review them with you. Where to Get Your Medications These medications were sent to [address] [place] [address], [address] [place] [id number] Phone:  [phone]  
· doxycycline 100 mg capsule 
  
 
_______________________________ Scribe Attestation Roberta Link (Aj) acting as a scribe for and in the presence of Jennifer Brantley MD     
November 15, 2018 at 8:03 AM 
    
Provider Attestation:     
I personally performed the services described in the documentation, reviewed the documentation, as recorded by the scribe in my presence, and it accurately and completely records my words and actions. November 15, 2018 at 8:03 AM - Jennifer Brantley MD   
 
 
_______________________________

## 2018-11-15 NOTE — LETTER
[address] [place] [address] [facility] EMERGENCY DEPT 
[address] [facility] [id number] [phone] Work/School Note Date: 11/15/2018 To Whom It May concern: 
 
Dawit Marley was seen and treated today in the emergency room by the following provider(s): 
Attending Provider: Sushant Nolan MD. Dawit Marley may return to work on 11/16/18. Sincerely, Sushant Nolan MD

## 2021-12-31 ENCOUNTER — APPOINTMENT (OUTPATIENT)
Dept: GENERAL RADIOLOGY | Age: 41
End: 2021-12-31
Attending: PHYSICIAN ASSISTANT
Payer: COMMERCIAL

## 2021-12-31 ENCOUNTER — APPOINTMENT (OUTPATIENT)
Dept: ULTRASOUND IMAGING | Age: 41
End: 2021-12-31
Attending: PHYSICIAN ASSISTANT
Payer: COMMERCIAL

## 2021-12-31 ENCOUNTER — APPOINTMENT (OUTPATIENT)
Dept: CT IMAGING | Age: 41
End: 2021-12-31
Attending: PHYSICIAN ASSISTANT
Payer: COMMERCIAL

## 2021-12-31 ENCOUNTER — HOSPITAL ENCOUNTER (EMERGENCY)
Age: 41
Discharge: HOME | End: 2021-12-31
Attending: STUDENT IN AN ORGANIZED HEALTH CARE EDUCATION/TRAINING PROGRAM
Payer: COMMERCIAL

## 2021-12-31 VITALS
HEART RATE: 101 BPM | SYSTOLIC BLOOD PRESSURE: 104 MMHG | RESPIRATION RATE: 20 BRPM | DIASTOLIC BLOOD PRESSURE: 81 MMHG | TEMPERATURE: 98.4 F | OXYGEN SATURATION: 99 %

## 2021-12-31 DIAGNOSIS — N76.0 BV (BACTERIAL VAGINOSIS): ICD-10-CM

## 2021-12-31 DIAGNOSIS — K76.0 HEPATIC STEATOSIS: ICD-10-CM

## 2021-12-31 DIAGNOSIS — B96.89 BV (BACTERIAL VAGINOSIS): ICD-10-CM

## 2021-12-31 DIAGNOSIS — R10.13 ABDOMINAL PAIN, EPIGASTRIC: Primary | ICD-10-CM

## 2021-12-31 LAB
ALBUMIN SERPL-MCNC: 2.9 G/DL (ref 3.4–5)
ALBUMIN/GLOB SERPL: 0.8 {RATIO} (ref 0.8–1.7)
ALP SERPL-CCNC: 68 U/L (ref 45–117)
ALT SERPL-CCNC: 32 U/L (ref 13–56)
ANION GAP SERPL CALC-SCNC: 6 MMOL/L (ref 3–18)
APPEARANCE UR: ABNORMAL
AST SERPL-CCNC: 21 U/L (ref 10–38)
BACTERIA URNS QL MICRO: ABNORMAL /HPF
BASOPHILS # BLD: 0 K/UL (ref 0–0.1)
BASOPHILS NFR BLD: 0 % (ref 0–2)
BILIRUB SERPL-MCNC: 0.2 MG/DL (ref 0.2–1)
BILIRUB UR QL: NEGATIVE
BUN SERPL-MCNC: 20 MG/DL (ref 7–18)
BUN/CREAT SERPL: 24 (ref 12–20)
CALCIUM SERPL-MCNC: 8.6 MG/DL (ref 8.5–10.1)
CHLORIDE SERPL-SCNC: 105 MMOL/L (ref 100–111)
CO2 SERPL-SCNC: 29 MMOL/L (ref 21–32)
COLOR UR: YELLOW
CREAT SERPL-MCNC: 0.84 MG/DL (ref 0.6–1.3)
DIFFERENTIAL METHOD BLD: ABNORMAL
EOSINOPHIL # BLD: 0 K/UL (ref 0–0.4)
EOSINOPHIL NFR BLD: 0 % (ref 0–5)
EPITH CASTS URNS QL MICRO: ABNORMAL /LPF (ref 0–5)
ERYTHROCYTE [DISTWIDTH] IN BLOOD BY AUTOMATED COUNT: 15.9 % (ref 11.6–14.5)
GLOBULIN SER CALC-MCNC: 3.6 G/DL (ref 2–4)
GLUCOSE SERPL-MCNC: 78 MG/DL (ref 74–99)
GLUCOSE UR STRIP.AUTO-MCNC: NEGATIVE MG/DL
HCG SERPL QL: NEGATIVE
HCT VFR BLD AUTO: 36.3 % (ref 35–45)
HGB BLD-MCNC: 12.1 G/DL (ref 12–16)
HGB UR QL STRIP: NEGATIVE
IMM GRANULOCYTES # BLD AUTO: 0.1 K/UL (ref 0–0.04)
IMM GRANULOCYTES NFR BLD AUTO: 0 % (ref 0–0.5)
KETONES UR QL STRIP.AUTO: NEGATIVE MG/DL
LEUKOCYTE ESTERASE UR QL STRIP.AUTO: ABNORMAL
LIPASE SERPL-CCNC: 176 U/L (ref 73–393)
LYMPHOCYTES # BLD: 3.7 K/UL (ref 0.9–3.6)
LYMPHOCYTES NFR BLD: 32 % (ref 21–52)
MCH RBC QN AUTO: 31.7 PG (ref 24–34)
MCHC RBC AUTO-ENTMCNC: 33.3 G/DL (ref 31–37)
MCV RBC AUTO: 95 FL (ref 78–100)
MONOCYTES # BLD: 1 K/UL (ref 0.05–1.2)
MONOCYTES NFR BLD: 9 % (ref 3–10)
NEUTS SEG # BLD: 6.6 K/UL (ref 1.8–8)
NEUTS SEG NFR BLD: 58 % (ref 40–73)
NITRITE UR QL STRIP.AUTO: NEGATIVE
NRBC # BLD: 0 K/UL (ref 0–0.01)
NRBC BLD-RTO: 0 PER 100 WBC
PH UR STRIP: 5.5 [PH] (ref 5–8)
PLATELET # BLD AUTO: 208 K/UL (ref 135–420)
PMV BLD AUTO: 9.1 FL (ref 9.2–11.8)
POTASSIUM SERPL-SCNC: 4.1 MMOL/L (ref 3.5–5.5)
PROT SERPL-MCNC: 6.5 G/DL (ref 6.4–8.2)
PROT UR STRIP-MCNC: NEGATIVE MG/DL
RBC # BLD AUTO: 3.82 M/UL (ref 4.2–5.3)
RBC #/AREA URNS HPF: ABNORMAL /HPF (ref 0–5)
SERVICE CMNT-IMP: NORMAL
SODIUM SERPL-SCNC: 140 MMOL/L (ref 136–145)
SP GR UR REFRACTOMETRY: 1.03 (ref 1–1.03)
TROPONIN-HIGH SENSITIVITY: 4 NG/L (ref 0–54)
UROBILINOGEN UR QL STRIP.AUTO: 0.2 EU/DL (ref 0.2–1)
WBC # BLD AUTO: 11.4 K/UL (ref 4.6–13.2)
WBC URNS QL MICRO: ABNORMAL /HPF (ref 0–4)
WET PREP GENITAL: NORMAL

## 2021-12-31 PROCEDURE — 76830 TRANSVAGINAL US NON-OB: CPT

## 2021-12-31 PROCEDURE — 85025 COMPLETE CBC W/AUTO DIFF WBC: CPT

## 2021-12-31 PROCEDURE — 99283 EMERGENCY DEPT VISIT LOW MDM: CPT

## 2021-12-31 PROCEDURE — 71046 X-RAY EXAM CHEST 2 VIEWS: CPT

## 2021-12-31 PROCEDURE — 81001 URINALYSIS AUTO W/SCOPE: CPT

## 2021-12-31 PROCEDURE — 84703 CHORIONIC GONADOTROPIN ASSAY: CPT

## 2021-12-31 PROCEDURE — 93005 ELECTROCARDIOGRAM TRACING: CPT

## 2021-12-31 PROCEDURE — 74177 CT ABD & PELVIS W/CONTRAST: CPT

## 2021-12-31 PROCEDURE — 80053 COMPREHEN METABOLIC PANEL: CPT

## 2021-12-31 PROCEDURE — 84484 ASSAY OF TROPONIN QUANT: CPT

## 2021-12-31 PROCEDURE — 87210 SMEAR WET MOUNT SALINE/INK: CPT

## 2021-12-31 PROCEDURE — 76856 US EXAM PELVIC COMPLETE: CPT

## 2021-12-31 PROCEDURE — 87491 CHLMYD TRACH DNA AMP PROBE: CPT

## 2021-12-31 PROCEDURE — 74011000636 HC RX REV CODE- 636: Performed by: STUDENT IN AN ORGANIZED HEALTH CARE EDUCATION/TRAINING PROGRAM

## 2021-12-31 PROCEDURE — 83690 ASSAY OF LIPASE: CPT

## 2021-12-31 RX ORDER — FAMOTIDINE 10 MG/ML
20 INJECTION INTRAVENOUS
Status: DISCONTINUED | OUTPATIENT
Start: 2021-12-31 | End: 2021-12-31 | Stop reason: HOSPADM

## 2021-12-31 RX ORDER — ONDANSETRON 2 MG/ML
4 INJECTION INTRAMUSCULAR; INTRAVENOUS
Status: DISCONTINUED | OUTPATIENT
Start: 2021-12-31 | End: 2021-12-31 | Stop reason: HOSPADM

## 2021-12-31 RX ORDER — METRONIDAZOLE 500 MG/1
500 TABLET ORAL 2 TIMES DAILY
Qty: 14 TABLET | Refills: 0 | Status: SHIPPED | OUTPATIENT
Start: 2021-12-31 | End: 2022-01-07

## 2021-12-31 RX ADMIN — IOPAMIDOL 100 ML: 612 INJECTION, SOLUTION INTRAVENOUS at 13:50

## 2021-12-31 NOTE — ED PROVIDER NOTES
EMERGENCY DEPARTMENT HISTORY AND PHYSICAL EXAM    2:47 PM      Date: 12/31/2021  Patient Name: Thang Gary    History of Presenting Illness     Chief Complaint   Patient presents with    Abdominal Pain     epigastric pain         History Provided By: Patient    Additional History (Context): Thang Gary is a 39 y.o. female with hx of asthma and other noted PMH who presents with complaint of productive cough x1 week. Patient denies fever chills, chest pain, shortness of breath, orthopnea. [facility] with a negative chest x-ray and Covid swab. Patient notes she is fully vaccinated for Covid, denies known exposure. Patient also notes epigastric abdominal pain associated with nausea x 5 days. Patient notes eating makes symptoms worse. Patient denies vomiting, diarrhea, dysuria, hematuria. Denies history of cholelithiasis, pancreatitis, GERD. Did not take any medication for the symptoms prior to arrival.    PCP: None    Current Facility-Administered Medications   Medication Dose Route Frequency Provider Last Rate Last Admin    ondansetron (ZOFRAN) injection 4 mg  4 mg IntraVENous NOW [place]        famotidine (PF) (PEPCID) injection 20 mg  20 mg IntraVENous NOW [place], [place]         Current Outpatient Medications   Medication Sig Dispense Refill    metroNIDAZOLE (FlagyL) 500 mg tablet Take 1 Tablet by mouth two (2) times a day for 7 days. 14 Tablet 0    MICROGESTIN FE 1/20, 28, 1 mg-20 mcg (21)/75 mg (7) tab TAKE 1 TABLET BY MOUTH DAILY 28 Tab 0    norethindrone-ethinyl estradiol (JUNEL FE 1/20, 28,) 1 mg-20 mcg (21)/75 mg (7) tab Take 1 Tab by mouth daily. 28 Tab 4    multivitamin, tx-iron-ca-min (THERA-M W/ IRON) 9 mg iron-400 mcg tab tablet Take 1 Tab by mouth daily.          Past History     Past Medical History:  Past Medical History:   Diagnosis Date    Asthma        Past Surgical History:  Past Surgical History: Procedure Laterality Date    HX DILATION AND CURETTAGE  2005, 2006       Family History:  Family History   Problem Relation Age of Onset   Bryant Arthritis-rheumatoid Mother     Diabetes Father     High Cholesterol Father     No Known Problems Brother     Thyroid Disease Maternal Grandmother        Social History:  Social History     Tobacco Use    Smoking status: Current Every Day Smoker     Packs/day: 0.50    Smokeless tobacco: Current User   Substance Use Topics    Alcohol use: Yes     Alcohol/week: 6.0 standard drinks     Types: 6 Cans of beer per week     Comment: occassion     Drug use: No       Allergies: Allergies   Allergen Reactions    Tylenol-Codeine #3 [Acetaminophen-Codeine] Rash    Vicodin [Hydrocodone-Acetaminophen] Rash         Review of Systems       Review of Systems   Constitutional: Negative for chills and fever. Respiratory: Positive for cough. Negative for shortness of breath. Cardiovascular: Negative for chest pain. Gastrointestinal: Positive for abdominal pain and nausea. Negative for vomiting. Skin: Negative for rash. Neurological: Negative for weakness. All other systems reviewed and are negative. Physical Exam     Visit Vitals  /81 (BP 1 Location: Left upper arm, BP Patient Position: At rest;Sitting)   Pulse (!) 101   Temp 98.4 °F (36.9 °C)   Resp 20   SpO2 99%         Physical Exam  Vitals and nursing note reviewed. Constitutional:       General: She is not in acute distress. Appearance: She is well-developed. She is not ill-appearing, toxic-appearing or diaphoretic. HENT:      Head: Normocephalic and atraumatic. Cardiovascular:      Rate and Rhythm: Normal rate and regular rhythm. Heart sounds: Normal heart sounds. No murmur heard. No friction rub. No gallop. Pulmonary:      Effort: Pulmonary effort is normal. No respiratory distress. Breath sounds: Normal breath sounds. No wheezing or rales.    Abdominal:      General: Abdomen is flat. Bowel sounds are normal.      Palpations: Abdomen is soft. Tenderness: There is abdominal tenderness in the right upper quadrant, epigastric area and left upper quadrant. There is no right CVA tenderness, left CVA tenderness, guarding or rebound. Negative signs include Goodman's sign. Musculoskeletal:         General: Normal range of motion. Cervical back: Normal range of motion and neck supple. Skin:     General: Skin is warm. Findings: No rash. Neurological:      Mental Status: She is alert. Diagnostic Study Results     Labs -  Recent Results (from the past 12 hour(s))   CBC WITH AUTOMATED DIFF    Collection Time: 12/31/21 11:35 AM   Result Value Ref Range    WBC 11.4 4.6 - 13.2 K/uL    RBC 3.82 (L) 4.20 - 5.30 M/uL    HGB 12.1 12.0 - 16.0 g/dL    HCT 36.3 35.0 - 45.0 %    MCV 95.0 78.0 - 100.0 FL    MCH 31.7 24.0 - 34.0 PG    MCHC 33.3 31.0 - 37.0 g/dL    RDW 15.9 (H) 11.6 - 14.5 %    PLATELET 903 228 - 801 K/uL    MPV 9.1 (L) 9.2 - 11.8 FL    NRBC 0.0 0  WBC    ABSOLUTE NRBC 0.00 0.00 - 0.01 K/uL    NEUTROPHILS 58 40 - 73 %    LYMPHOCYTES 32 21 - 52 %    MONOCYTES 9 3 - 10 %    EOSINOPHILS 0 0 - 5 %    BASOPHILS 0 0 - 2 %    IMMATURE GRANULOCYTES 0 0.0 - 0.5 %    ABS. NEUTROPHILS 6.6 1.8 - 8.0 K/UL    ABS. LYMPHOCYTES 3.7 (H) 0.9 - 3.6 K/UL    ABS. MONOCYTES 1.0 0.05 - 1.2 K/UL    ABS. EOSINOPHILS 0.0 0.0 - 0.4 K/UL    ABS. BASOPHILS 0.0 0.0 - 0.1 K/UL    ABS. IMM.  GRANS. 0.1 (H) 0.00 - 0.04 K/UL    DF AUTOMATED     METABOLIC PANEL, COMPREHENSIVE    Collection Time: 12/31/21 11:35 AM   Result Value Ref Range    Sodium 140 136 - 145 mmol/L    Potassium 4.1 3.5 - 5.5 mmol/L    Chloride 105 100 - 111 mmol/L    CO2 29 21 - 32 mmol/L    Anion gap 6 3.0 - 18 mmol/L    Glucose 78 74 - 99 mg/dL    BUN 20 (H) 7.0 - 18 MG/DL    Creatinine 0.84 0.6 - 1.3 MG/DL    BUN/Creatinine ratio 24 (H) 12 - 20      GFR est AA >60 >60 ml/min/1.73m2    GFR est non-AA >60 >60 ml/min/1.73m2 Calcium 8.6 8.5 - 10.1 MG/DL    Bilirubin, total 0.2 0.2 - 1.0 MG/DL    ALT (SGPT) 32 13 - 56 U/L    AST (SGOT) 21 10 - 38 U/L    Alk.  phosphatase 68 45 - 117 U/L    Protein, total 6.5 6.4 - 8.2 g/dL    Albumin 2.9 (L) 3.4 - 5.0 g/dL    Globulin 3.6 2.0 - 4.0 g/dL    A-G Ratio 0.8 0.8 - 1.7     LIPASE    Collection Time: 12/31/21 11:35 AM   Result Value Ref Range    Lipase 176 73 - 393 U/L   URINALYSIS W/ RFLX MICROSCOPIC    Collection Time: 12/31/21 11:35 AM   Result Value Ref Range    Color YELLOW      Appearance CLOUDY      Specific gravity 1.026 1.005 - 1.030      pH (UA) 5.5 5.0 - 8.0      Protein Negative NEG mg/dL    Glucose Negative NEG mg/dL    Ketone Negative NEG mg/dL    Bilirubin Negative NEG      Blood Negative NEG      Urobilinogen 0.2 0.2 - 1.0 EU/dL    Nitrites Negative NEG      Leukocyte Esterase TRACE (A) NEG     HCG QL SERUM    Collection Time: 12/31/21 11:35 AM   Result Value Ref Range    HCG, Ql. Negative NEG     TROPONIN-HIGH SENSITIVITY    Collection Time: 12/31/21 11:35 AM   Result Value Ref Range    Troponin-High Sensitivity 4 0 - 54 ng/L   URINE MICROSCOPIC ONLY    Collection Time: 12/31/21 11:35 AM   Result Value Ref Range    WBC 0 to 1 0 - 4 /hpf    RBC 0 to 1 0 - 5 /hpf    Epithelial cells 4+ 0 - 5 /lpf    Bacteria 2+ (A) NEG /hpf   EKG, 12 LEAD, INITIAL    Collection Time: 12/31/21 11:37 AM   Result Value Ref Range    Ventricular Rate 91 BPM    Atrial Rate 91 BPM    P-R Interval 120 ms    QRS Duration 68 ms    Q-T Interval 340 ms    QTC Calculation (Bezet) 418 ms    Calculated P Axis 64 degrees    Calculated R Axis 31 degrees    Calculated T Axis 45 degrees    Diagnosis       Normal sinus rhythm  Normal ECG  No previous ECGs available     WET PREP    Collection Time: 12/31/21  4:37 PM    Specimen: Vagina   Result Value Ref Range    Special Requests: NO SPECIAL REQUESTS      Wet prep FEW  CLUE CELLS PRESENT        Wet prep NO TRICHOMONAS SEEN      Wet prep NO YEAST SEEN Radiologic Studies -   CT ABD PELV W CONT   Final Result   1. Increased fluid within nondistended small bowel loops in the lower abdomen   and pelvis, with mural hyperemia, nonspecific but suggestive of enteritis. 2.  Mildly prominent cervix, nonspecific. Recommend correlation with pelvic exam   with inflammatory or neoplastic process. 3.  Suspected left ovarian corpus luteal cyst. Nonspecific borderline thickening   of the endometrial stripe. Suggest follow-up with pelvic ultrasound during the   early proliferative phase of menstruation. 4.  Hepatic steatosis. Additional incidental findings as above. XR CHEST PA LAT   Final Result   No radiographic evidence of acute cardiopulmonary process. US PELV NON OBS    (Results Pending)         Medical Decision Making   I am the first provider for this patient. I reviewed the vital signs, available nursing notes, past medical history, past surgical history, family history and social history. Vital Signs-Reviewed the patient's vital signs. Pulse Oximetry Analysis -  99% on room air       Records Reviewed: Nursing Notes and Old Medical Records (Time of Review: 2:47 PM)    ED Course: Progress Notes, Reevaluation, and Consults:  3:30 PM: CT resulted. Discussed with pt, notes she does not have a gynecologist for follow-up. Denies vaginal discharge, vaginal bleeding, dysuria, hematuria. LMP 1 month ago. Discussed ultrasound and pelvic as outpatient or today, patient would like to proceed with imaging and pelvic today. TVUS and pelvic ordered. Discussed that we will refer her for close outpatient follow-up with gynecology regardless. Patient verbalized understanding importance of follow-up. PROGRESS NOTE:  6:00 PM  Patient care will be transferred to Katherine Calles PA-C. Discussed available diagnostic results and care plan at length.  Pending TVUS   Written by Arabella Donis PA-C      Procedures:   Pelvic Exam    Date/Time: 12/31/2021 4:45 PM  Performed by: PA  Procedure duration:  5 minutes. Documented by:  Mildred Bowen . Type of exam performed: bimanual and speculum. External genitalia appearance: normal.    Vaginal exam:  discharge. The amount of discharge was:  moderate. The discharge was thin and mucopurulent. Cervical exam:  normal.    Specimen(s) collected:  chlamydia, GC and vaginal culture. Bimanual exam:  normal.    Patient tolerance: patient tolerated the procedure well with no immediate complications          Diagnosis     Clinical Impression:   1. Abdominal pain, epigastric    2. BV (bacterial vaginosis)    3. Hepatic steatosis        Disposition: home     Follow-up Information     Follow up With Specialties Details Why [address] [facility]    [facility] EMERGENCY DEPT Emergency Medicine  If symptoms worsen [address] [facility] [address]  [place]   Schedule an appointment as soon as possible for a visit   Dionicio Gaytan 3  [facility] [address] [place] [address] [facility] Obstetrics & Gynecology, Obstetrics Schedule an appointment as soon as possible for a visit   [facility] [address]  [address] [facility] [address]  [place] [address] [place] [address] Obstetrics And Gynecology  Schedule an appointment as soon as possible for a visit   [address] [address]  [phone]           Patient's Medications   Start Taking    METRONIDAZOLE (FLAGYL) 500 MG TABLET    Take 1 Tablet by mouth two (2) times a day for 7 days. Continue Taking    MICROGESTIN FE 1/20, 28, 1 MG-20 MCG (21)/75 MG (7) TAB    TAKE 1 TABLET BY MOUTH DAILY    MULTIVITAMIN, TX-IRON-CA-MIN (THERA-M W/ IRON) 9 MG IRON-400 MCG TAB TABLET    Take 1 Tab by mouth daily. NORETHINDRONE-ETHINYL ESTRADIOL (JUNEL FE 1/20, 28,) 1 MG-20 MCG (21)/75 MG (7) TAB    Take 1 Tab by mouth daily.    These Medications have changed    No medications on file   Stop Taking    No medications on file Dictation disclaimer:  Please note that this dictation was completed with [employer]ista, the computer voice recognition software. Quite often unanticipated grammatical, syntax, homophones, and other interpretive errors are inadvertently transcribed by the computer software. Please disregard these errors. Please excuse any errors that have escaped final proofreading. Size Of Lesion: 5.5mm Detail Level: Detailed Size Of Lesion: 5mm Size Of Lesion: 7mm Size Of Lesion: 6mm Size Of Lesion: ***7x5mm dark brn mac Size Of Lesion: ***9mm brn mac Size Of Lesion: 3mm

## 2021-12-31 NOTE — Clinical Note
Travon Patel was seen and treated in our emergency department on 12/31/2021. Ms. Travon Patel was seen in the ED on 12/31/2021 and may be excused from work for 1 week.      LILI Gamino MD

## 2021-12-31 NOTE — DISCHARGE INSTRUCTIONS
Take medication as prescribed. Follow-up with the gynecologist within 2 days for reassessment. Bring the results from this visit with you for their review. Return to the ED immediately for any new, worsening, or persistent symptoms, including fever, vomiting, or any other medical concerns.

## 2022-01-01 LAB
ATRIAL RATE: 91 BPM
CALCULATED P AXIS, ECG09: 64 DEGREES
CALCULATED R AXIS, ECG10: 31 DEGREES
CALCULATED T AXIS, ECG11: 45 DEGREES
DIAGNOSIS, 93000: NORMAL
P-R INTERVAL, ECG05: 120 MS
Q-T INTERVAL, ECG07: 340 MS
QRS DURATION, ECG06: 68 MS
QTC CALCULATION (BEZET), ECG08: 418 MS
VENTRICULAR RATE, ECG03: 91 BPM

## 2022-01-01 NOTE — ED NOTES
6:00 PM Assumed care of the pt at this time. Discussed with DELFINA Palumbo concerning patient Magalys Shepard, standard discussion of reason for visit, HPI, ROS, PE, and current results available. Recommendation for obtaining pending US and bedside re-evaluation to dispo the pt. Katherine Calles PA-C     8:05 PM US resulted, no new acute abnormalities, recommended repeat US during proliferative phase of the menstrual cycle. Pt made aware of these results. Will plan to d/c with GYN follow. Return precautions advised. Pt agrees. Katherine Calles PA-C     Disposition: d/c    Dictation disclaimer:  Please note that this dictation was completed with [employer], the computer voice recognition software. Quite often unanticipated grammatical, syntax, homophones, and other interpretive errors are inadvertently transcribed by the computer software. Please disregard these errors. Please excuse any errors that have escaped final proofreading.

## 2024-07-05 ENCOUNTER — HOSPITAL ENCOUNTER (OUTPATIENT)
Facility: HOSPITAL | Age: 44
Discharge: HOME | End: 2024-07-05
Attending: OBSTETRICS & GYNECOLOGY
Payer: COMMERCIAL

## 2024-07-05 VITALS — BODY MASS INDEX: 23.74 KG/M2 | WEIGHT: 134 LBS | HEIGHT: 63 IN

## 2024-07-05 DIAGNOSIS — Z12.31 BREAST CANCER SCREENING BY MAMMOGRAM: ICD-10-CM

## 2024-07-05 PROCEDURE — 77063 BREAST TOMOSYNTHESIS BI: CPT

## 2025-06-23 ENCOUNTER — TRANSCRIBE ORDERS (OUTPATIENT)
Facility: HOSPITAL | Age: 45
End: 2025-06-23

## 2025-06-23 DIAGNOSIS — Z12.31 VISIT FOR SCREENING MAMMOGRAM: Primary | ICD-10-CM

## 2025-08-01 ENCOUNTER — HOSPITAL ENCOUNTER (OUTPATIENT)
Facility: HOSPITAL | Age: 45
Discharge: HOME | End: 2025-08-01
Attending: OBSTETRICS & GYNECOLOGY
Payer: COMMERCIAL

## 2025-08-01 VITALS — HEIGHT: 63 IN | BODY MASS INDEX: 23.57 KG/M2 | WEIGHT: 133 LBS

## 2025-08-01 DIAGNOSIS — Z12.31 VISIT FOR SCREENING MAMMOGRAM: ICD-10-CM

## 2025-08-01 PROCEDURE — 77063 BREAST TOMOSYNTHESIS BI: CPT
